# Patient Record
Sex: FEMALE | Race: WHITE
[De-identification: names, ages, dates, MRNs, and addresses within clinical notes are randomized per-mention and may not be internally consistent; named-entity substitution may affect disease eponyms.]

---

## 2019-04-11 ENCOUNTER — HOSPITAL ENCOUNTER (EMERGENCY)
Dept: HOSPITAL 74 - FER | Age: 64
Discharge: HOME | End: 2019-04-11
Payer: COMMERCIAL

## 2019-04-11 VITALS — DIASTOLIC BLOOD PRESSURE: 72 MMHG | SYSTOLIC BLOOD PRESSURE: 125 MMHG | TEMPERATURE: 97.7 F | HEART RATE: 68 BPM

## 2019-04-11 VITALS — BODY MASS INDEX: 21.7 KG/M2
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
## 2019-04-11 DIAGNOSIS — M81.0: ICD-10-CM

## 2019-04-11 DIAGNOSIS — F60.0: ICD-10-CM

## 2019-04-11 DIAGNOSIS — S93.402A: Primary | ICD-10-CM
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
## 2019-04-11 PROCEDURE — 2W3RX1Z IMMOBILIZATION OF LEFT LOWER LEG USING SPLINT: ICD-10-PCS

## 2019-04-11 NOTE — PDOC
History of Present Illness





- General


History Source: Patient


Exam Limitations: No Limitations





- History of Present Illness


Initial Comments: 


04/11/19 18:09


The patient is a 63-year-old female with a past medical history significant for 

osteoporosis (current not on medication) presents to the emergency department 

with an ankle injury. The patient reports around 2:30 pm today, she was walking 

to the phone when she twisted her left ankle outwards, denies falling. The 

patient states she was able to ambulate on the ankle with discomfort; she went 

to the stairs and sat down. The patient reports she put ice on the ankle and 

wrapped it up tightly, without relief. Denies taking any medication. The 

patient indicates the pain had been progressively worsening. The patient 

reports she started a new job yesterday, where the facility was freezing, which 

made her knee down numb. Denies numbness, tingling or loss of sensation. 


Allergies: Abx for UTI, (unknown) 


PCP: Dr. Schoen





<Roslyn Younger - Last Filed: 04/11/19 18:12>





<Eb White - Last Filed: 04/11/19 18:20>





- General


Chief Complaint: Injury


Stated Complaint: LEFT ANKLE, LEFT FOOT PAIN


Time Seen by Provider: 04/11/19 17:42





Past History





<Roslyn Younger - Last Filed: 04/11/19 18:12>





- Past Medical History


COPD: No


Psychiatric Problems: Yes (paranoia)





- Surgical History


Abdominal Surgery: Yes (for intestinal blockage)





- Suicide/Smoking/Psychosocial Hx


Smoking History: Never smoked


Have you smoked in the past 12 months: No


Information on smoking cessation initiated: No


Hx Alcohol Use: No


Drug/Substance Use Hx: No





<Eb White - Last Filed: 04/11/19 18:20>





- Past Medical History


Allergies/Adverse Reactions: 


 Allergies











Allergy/AdvReac Type Severity Reaction Status Date / Time


 


unknown antibiotic for uti Allergy   Uncoded 04/11/19 17:44











Home Medications: 


Ambulatory Orders





Risperidone [Risperdal] 1 mg PO HS 08/24/15 











**Review of Systems





- Review of Systems


Musculoskeletal: Yes: Joint Pain


Neurological: No: Paresthesia, Tingling, Weakness





<Eb White - Last Filed: 04/11/19 18:20>





*Physical Exam





- Vital Signs


 Last Vital Signs











Temp Pulse Resp BP Pulse Ox


 


 97.7 F   68   18   125/72   100 


 


 04/11/19 17:40  04/11/19 17:40  04/11/19 17:40  04/11/19 17:40  04/11/19 17:40














- Physical Exam


Comments: 


04/11/19 18:11


GENERAL: The patient is awake, alert, and fully oriented, in no acute distress.


HEAD:Normal with no signs of trauma.


EYES: Pupils equal, round and reactive to light, extraocular movements intact, 

sclera anicteric, conjunctiva clear.


EXTREMITIES: +Discomfort to the anterior medial ankle joint, no focal 

tenderness to the knees, malleolus, or the bones of the foot. Neurovascularly 

intact. Full range of motion of the knees, ankles and toes. No deformities. No 

bruising.


NEUROLOGICAL: Normal speech, normal gait. 


PSYCH: Normal mood, normal affect.


SKIN: Warm, Dry, normal turgor, no rashes or lesions noted.





<Roslyn Younger - Last Filed: 04/11/19 18:12>





- Vital Signs


 Last Vital Signs











Temp Pulse Resp BP Pulse Ox


 


 97.7 F   68   18   125/72   100 


 


 04/11/19 17:40  04/11/19 17:40  04/11/19 17:40  04/11/19 17:40  04/11/19 17:40














<Eb White - Last Filed: 04/11/19 18:20>





ED Treatment Course





- RADIOLOGY


Radiology Studies Ordered: 














 Category Date Time Status


 


 ANKLE & FOOT-LEFT* [RAD] Stat Radiology  04/11/19 17:52 Ordered














<Eb White - Last Filed: 04/11/19 18:20>





Medical Decision Making





- Medical Decision Making





04/11/19 17:54


63-year-old female presents with left ankle eversion injury about 3 hours ago, 

delayed onset of discomfort to the anterior ankle joint space without motor or 

sensory deficit. No other injuries. Did not take anything for pain, applied 

ice. Presents for evaluation.





Vital signs normal.


Left lower extremity without deformity, slight discomfort to the anterior 

medial left ankle joint space without bony tenderness to the tibia/fibula/

malleoli/. Neurovascular intact.





63-year-old female with left ankle sprain, rule out fracture.


Motrin for pain


Already applied ice


Left ankle/foot x-ray


Reassurance and disposition accordingly





04/11/19 18:18


On my preliminary review, there is no acute fracture or dislocation of the left 

ankle or foot. Positive osteoporosis.


Air cast applied, understands return criteria.





<Eb White - Last Filed: 04/11/19 18:20>





*DC/Admit/Observation/Transfer





- Attestations


Scribe Attestion: 


04/11/19 18:09


Documentation prepared by Roslyn Younger, acting as medical scribe for Eb White MD. 





<Roslyn Younger - Last Filed: 04/11/19 18:12>





<Eb White - Last Filed: 04/11/19 18:20>


Diagnosis at time of Disposition: 


Left ankle sprain


Qualifiers:


 Encounter type: initial encounter Involved ligament of ankle: unspecified 

ligament Qualified Code(s): S93.402A - Sprain of unspecified ligament of left 

ankle, initial encounter








- Discharge Dispostion


Disposition: HOME


Condition at time of disposition: Stable





- Referrals


Referrals: 


Schoen,Herbert, MD [Primary Care Provider] - 


Aubrey Mckenna MD [Staff Physician] - 





- Patient Instructions


Printed Discharge Instructions:  DI for Ankle Sprain


Additional Instructions: 


Activity as tolerated. Stay hydrated. AirCast as provided as needed for 

support. 





Tylenol 1000 mg every 8 hours and/or ibuprofen 600 mg every 8 hours as needed 

for pain. Ice and elevate the affected areas for 20 minutes every 3-4 hours to 

reduce swelling. 





Continue your medications as previously prescribed by your physician.





You should follow up with an orthopedic as needed regarding today's emergency 

department visit.





Return to the emergency department for any new or concerning symptoms, 

particularly intolerable pain, severe swelling or discoloration, numbness.





- Post Discharge Activity
 used

## 2021-04-20 ENCOUNTER — APPOINTMENT (OUTPATIENT)
Dept: OBGYN | Facility: CLINIC | Age: 66
End: 2021-04-20

## 2022-07-26 ENCOUNTER — EMERGENCY (EMERGENCY)
Facility: HOSPITAL | Age: 67
LOS: 1 days | Discharge: HOME | End: 2022-07-26
Admitting: EMERGENCY MEDICINE
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
## 2022-07-26 DIAGNOSIS — S29.8XXA OTHER SPECIFIED INJURIES OF THORAX, INITIAL ENCOUNTER: ICD-10-CM
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
## 2022-07-26 DIAGNOSIS — M25.511 PAIN IN RIGHT SHOULDER: ICD-10-CM

## 2022-07-26 DIAGNOSIS — S49.81XA OTHER SPECIFIED INJURIES OF RIGHT SHOULDER AND UPPER ARM, INITIAL ENCOUNTER: ICD-10-CM

## 2022-07-26 PROCEDURE — 99284 EMERGENCY DEPT VISIT MOD MDM: CPT

## 2022-07-26 PROCEDURE — 71046 X-RAY EXAM CHEST 2 VIEWS: CPT | Mod: 26

## 2022-07-26 PROCEDURE — 73030 X-RAY EXAM OF SHOULDER: CPT | Mod: 26,RT

## 2022-07-28 ENCOUNTER — EMERGENCY (EMERGENCY)
Facility: HOSPITAL | Age: 67
LOS: 1 days | Discharge: HOME | End: 2022-07-28
Admitting: EMERGENCY MEDICINE

## 2022-07-28 PROCEDURE — L9992: CPT

## 2022-08-16 PROBLEM — Z00.00 ENCOUNTER FOR PREVENTIVE HEALTH EXAMINATION: Status: ACTIVE | Noted: 2022-08-16

## 2022-08-23 ENCOUNTER — APPOINTMENT (OUTPATIENT)
Dept: HEMATOLOGY ONCOLOGY | Facility: CLINIC | Age: 67
End: 2022-08-23

## 2024-10-07 ENCOUNTER — EMERGENCY (EMERGENCY)
Facility: HOSPITAL | Age: 69
LOS: 1 days | Discharge: HOME | End: 2024-10-07
Attending: EMERGENCY MEDICINE
Payer: SELF-PAY

## 2024-10-07 ENCOUNTER — EMERGENCY (EMERGENCY)
Facility: HOSPITAL | Age: 69
LOS: 1 days | Discharge: HOME | End: 2024-10-07
Attending: STUDENT IN AN ORGANIZED HEALTH CARE EDUCATION/TRAINING PROGRAM
Payer: MEDICARE

## 2024-10-07 VITALS
RESPIRATION RATE: 18 BRPM | HEIGHT: 62 IN | WEIGHT: 104.94 LBS | TEMPERATURE: 98 F | OXYGEN SATURATION: 97 % | HEART RATE: 75 BPM | DIASTOLIC BLOOD PRESSURE: 87 MMHG | SYSTOLIC BLOOD PRESSURE: 190 MMHG

## 2024-10-07 VITALS
WEIGHT: 110.01 LBS | HEIGHT: 62 IN | RESPIRATION RATE: 16 BRPM | TEMPERATURE: 98 F | SYSTOLIC BLOOD PRESSURE: 174 MMHG | HEART RATE: 65 BPM | DIASTOLIC BLOOD PRESSURE: 77 MMHG | OXYGEN SATURATION: 97 %

## 2024-10-07 LAB
BASOPHILS # BLD AUTO: 0.03 K/UL — SIGNIFICANT CHANGE UP (ref 0–0.2)
BASOPHILS NFR BLD AUTO: 0.4 % — SIGNIFICANT CHANGE UP (ref 0–2)
EOSINOPHIL # BLD AUTO: 0.07 K/UL — SIGNIFICANT CHANGE UP (ref 0–0.5)
EOSINOPHIL NFR BLD AUTO: 1 % — SIGNIFICANT CHANGE UP (ref 0–6)
FLUAV AG NPH QL: SIGNIFICANT CHANGE UP
FLUBV AG NPH QL: SIGNIFICANT CHANGE UP
HCT VFR BLD CALC: 44.2 % — SIGNIFICANT CHANGE UP (ref 34.5–45)
HGB BLD-MCNC: 14.6 G/DL — SIGNIFICANT CHANGE UP (ref 11.5–15.5)
IMM GRANULOCYTES NFR BLD AUTO: 0.1 % — SIGNIFICANT CHANGE UP (ref 0–0.9)
LYMPHOCYTES # BLD AUTO: 1.88 K/UL — SIGNIFICANT CHANGE UP (ref 1–3.3)
LYMPHOCYTES # BLD AUTO: 26.3 % — SIGNIFICANT CHANGE UP (ref 13–44)
MCHC RBC-ENTMCNC: 29.1 PG — SIGNIFICANT CHANGE UP (ref 27–34)
MCHC RBC-ENTMCNC: 33 GM/DL — SIGNIFICANT CHANGE UP (ref 32–36)
MCV RBC AUTO: 88 FL — SIGNIFICANT CHANGE UP (ref 80–100)
MONOCYTES # BLD AUTO: 0.46 K/UL — SIGNIFICANT CHANGE UP (ref 0–0.9)
MONOCYTES NFR BLD AUTO: 6.4 % — SIGNIFICANT CHANGE UP (ref 2–14)
NEUTROPHILS # BLD AUTO: 4.69 K/UL — SIGNIFICANT CHANGE UP (ref 1.8–7.4)
NEUTROPHILS NFR BLD AUTO: 65.8 % — SIGNIFICANT CHANGE UP (ref 43–77)
PLATELET # BLD AUTO: 269 K/UL — SIGNIFICANT CHANGE UP (ref 150–400)
RBC # BLD: 5.02 M/UL — SIGNIFICANT CHANGE UP (ref 3.8–5.2)
RBC # FLD: 13.6 % — SIGNIFICANT CHANGE UP (ref 10.3–14.5)
RSV RNA NPH QL NAA+NON-PROBE: SIGNIFICANT CHANGE UP
SARS-COV-2 RNA SPEC QL NAA+PROBE: SIGNIFICANT CHANGE UP
WBC # BLD: 7.14 K/UL — SIGNIFICANT CHANGE UP (ref 3.8–10.5)
WBC # FLD AUTO: 7.14 K/UL — SIGNIFICANT CHANGE UP (ref 3.8–10.5)

## 2024-10-07 PROCEDURE — 82962 GLUCOSE BLOOD TEST: CPT

## 2024-10-07 PROCEDURE — 99285 EMERGENCY DEPT VISIT HI MDM: CPT

## 2024-10-07 PROCEDURE — 99283 EMERGENCY DEPT VISIT LOW MDM: CPT

## 2024-10-07 PROCEDURE — 99282 EMERGENCY DEPT VISIT SF MDM: CPT

## 2024-10-07 PROCEDURE — 93010 ELECTROCARDIOGRAM REPORT: CPT

## 2024-10-07 RX ORDER — MIDAZOLAM HCL 1 MG/ML
2 VIAL (ML) INJECTION ONCE
Refills: 0 | Status: DISCONTINUED | OUTPATIENT
Start: 2024-10-07 | End: 2024-10-07

## 2024-10-07 RX ORDER — OLANZAPINE 2.5 MG
5 TABLET ORAL ONCE
Refills: 0 | Status: COMPLETED | OUTPATIENT
Start: 2024-10-07 | End: 2024-10-07

## 2024-10-07 NOTE — ED ADULT NURSE NOTE - OBJECTIVE STATEMENT
Patient presents with social work complaints, per report.  Pt refuses to answer questions, throughout shift, noted with more erratic behavior, stating "they're trying to kill me."  Pt refused CT scan, stating "they're trying to kill me with electricity and make my veins thrombose and explode." "My family is dead, they're all dead, they put me in second grade like all the others.  I'm the smartest one here."  Pt medicated and taken to CC area for further monitoring and placed in yellow gown pending psych.

## 2024-10-07 NOTE — ED ADULT TRIAGE NOTE - CHIEF COMPLAINT QUOTE
pt recently in Emergency Department refusing treatment found sitting in hallway states she wants to see a doctor denies pain states she has smelling issues refuses to state anything else

## 2024-10-07 NOTE — ED PROVIDER NOTE - AVIAN FLU SYMPTOMS
POC reviewed with pt.  Pt states complete understanding. All questions answered.   
Plan of care reviewed with patient, discharge criteria met and instructions given. Verbalized understanding.   
No

## 2024-10-07 NOTE — ED ADULT NURSE NOTE - NSFALLUNIVINTERV_ED_ALL_ED
Bed/Stretcher in lowest position, wheels locked, appropriate side rails in place/Call bell, personal items and telephone in reach/Instruct patient to call for assistance before getting out of bed/chair/stretcher/Non-slip footwear applied when patient is off stretcher/[place] to call system/Physically safe environment - no spills, clutter or unnecessary equipment/Purposeful proactive rounding/Room/bathroom lighting operational, light cord in reach

## 2024-10-07 NOTE — ED ADULT TRIAGE NOTE - CHIEF COMPLAINT QUOTE
as per EMS pt was found sitting in back seat of her car in the middle of the street pt was uncooperative with EMS, pt A&Ox4 easily irritable, stating "I was just tired and resting in my car", resp even and unlabored no distress noted, pt changed into yellow gown and belongings secured

## 2024-10-07 NOTE — ED PROVIDER NOTE - OBJECTIVE STATEMENT
Patient is a 69-year-old female who denies any past medical history who presents to the ED brought in by EMS for reported bizarre behavior.  Patient was found in back of car.  Patient says she was just resting.  Patient says that she can make her own decisions and that she would like to leave.  Patient is alert and oriented, irritable, but does answer directions.  Denies any SI, HI, or AVH.  Patient is clean, well-dressed.  Patient has no medical complaints.  When asked for corroboration, provides phone number of her friend Bob at [phone], is currently unable to be reached.  Message was left.
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
## 2024-10-07 NOTE — ED PROVIDER NOTE - PHYSICAL EXAMINATION
Gen: well nourished female in no acute distress. She is holding her right forehead however deny any pain to head or eye when asked   Head: normocephalic, atraumatic  EENT: EOMI, moist mucous membranes. pupils 2-3mm, reactive bilaterally  Lung: no increased work of breathing, clear to auscultation bilaterally, no wheezing, rales, rhonchi, speaking in full sentences  CV: regular rate, regular rhythm  Abd: soft, non-tender, non-distended,  no CVA tenderness  MSK: No edema, no visible deformities, full range of motion in all 4 extremities  Neuro: Awake, alert, oriented x 3. no focal neurologic deficits  Skin: No obvious rash, no jaundice  Psych: bizarre affect, normal speech

## 2024-10-07 NOTE — ED PROVIDER NOTE - CLINICAL SUMMARY MEDICAL DECISION MAKING FREE TEXT BOX
Ddx: No evidence of manic or suicidal behavior/ pt able to answer questions, and exhibits no signs of acting upon internal stimuli. No signs of intoxication.  Plan: Ideally would be to obtain corroboration from family or friends, but pt contact unable to be reached, and pt has no other contacts. Pt declines medical or psychiatric care, and does not appear to be danger to self or others. Patient has capacity to deny further care. At this time, risks of sedating patient to obtain labs and further evaluation outweigh benefits.

## 2024-10-07 NOTE — ED PROVIDER NOTE - PROGRESS NOTE DETAILS
Melisa Chamberlain, DO: patient becoming increasingly confused and agitated, refusing CT, screaming "they're after me!". Zyprexa 5mg IM ordered. Melisa Chamberlain, DO: patient did require additional ativan 2mg IV to complete CT head and remainder of labs. Work up negative, patient medically cleared. Case endorsed to tele psych. pending eval. Melisa Chamberlain, DO: patient becoming increasingly confused and agitated, refusing CT, screaming "they're after me!". Zyprexa 5mg and Versed 2mg IM ordered. Dr. Houston: Pt signed out to me by Dr. Simpson. No acute overnight events. Patient pending reassessment by psych. ITNO Desouza: patient received in s/o; has bed at [facility], transfer to Dr. Swan
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
## 2024-10-07 NOTE — ED PROVIDER NOTE - PATIENT PORTAL LINK FT
You can access the FollowMyHealth Patient Portal offered by [facility] by registering at the following website: [email or web address]. By joining [facility]’s FollowMyHealth portal, you will also be able to view your health information using other applications (apps) compatible with our system.

## 2024-10-08 DIAGNOSIS — F29 UNSPECIFIED PSYCHOSIS NOT DUE TO A SUBSTANCE OR KNOWN PHYSIOLOGICAL CONDITION: ICD-10-CM

## 2024-10-08 LAB
ALBUMIN SERPL ELPH-MCNC: 4.3 G/DL — SIGNIFICANT CHANGE UP (ref 3.3–5.2)
ALP SERPL-CCNC: 92 U/L — SIGNIFICANT CHANGE UP (ref 40–120)
ALT FLD-CCNC: 14 U/L — SIGNIFICANT CHANGE UP
AMPHET UR-MCNC: NEGATIVE — SIGNIFICANT CHANGE UP
ANION GAP SERPL CALC-SCNC: 9 MMOL/L — SIGNIFICANT CHANGE UP (ref 5–17)
APAP SERPL-MCNC: <3 UG/ML — LOW (ref 10–26)
APPEARANCE UR: CLEAR — SIGNIFICANT CHANGE UP
AST SERPL-CCNC: 23 U/L — SIGNIFICANT CHANGE UP
BACTERIA # UR AUTO: NEGATIVE /HPF — SIGNIFICANT CHANGE UP
BARBITURATES UR SCN-MCNC: NEGATIVE — SIGNIFICANT CHANGE UP
BENZODIAZ UR-MCNC: POSITIVE
BILIRUB SERPL-MCNC: 1.6 MG/DL — SIGNIFICANT CHANGE UP (ref 0.4–2)
BILIRUB UR-MCNC: NEGATIVE — SIGNIFICANT CHANGE UP
BUN SERPL-MCNC: 14.7 MG/DL — SIGNIFICANT CHANGE UP (ref 8–20)
CALCIUM SERPL-MCNC: 9.6 MG/DL — SIGNIFICANT CHANGE UP (ref 8.4–10.5)
CAST: 1 /LPF — SIGNIFICANT CHANGE UP (ref 0–4)
CHLORIDE SERPL-SCNC: 99 MMOL/L — SIGNIFICANT CHANGE UP (ref 96–108)
CO2 SERPL-SCNC: 27 MMOL/L — SIGNIFICANT CHANGE UP (ref 22–29)
COCAINE METAB.OTHER UR-MCNC: NEGATIVE — SIGNIFICANT CHANGE UP
COLOR SPEC: YELLOW — SIGNIFICANT CHANGE UP
CREAT SERPL-MCNC: 0.62 MG/DL — SIGNIFICANT CHANGE UP (ref 0.5–1.3)
DIFF PNL FLD: NEGATIVE — SIGNIFICANT CHANGE UP
EGFR: 96 ML/MIN/1.73M2 — SIGNIFICANT CHANGE UP
ETHANOL SERPL-MCNC: <10 MG/DL — SIGNIFICANT CHANGE UP (ref 0–9)
FENTANYL UR QL SCN: NEGATIVE — SIGNIFICANT CHANGE UP
GLUCOSE SERPL-MCNC: 106 MG/DL — HIGH (ref 70–99)
GLUCOSE UR QL: NEGATIVE MG/DL — SIGNIFICANT CHANGE UP
HIV 1 & 2 AB SERPL IA.RAPID: SIGNIFICANT CHANGE UP
KETONES UR-MCNC: ABNORMAL MG/DL
LEUKOCYTE ESTERASE UR-ACNC: ABNORMAL
METHADONE UR-MCNC: NEGATIVE — SIGNIFICANT CHANGE UP
NITRITE UR-MCNC: NEGATIVE — SIGNIFICANT CHANGE UP
OPIATES UR-MCNC: NEGATIVE — SIGNIFICANT CHANGE UP
PCP SPEC-MCNC: SIGNIFICANT CHANGE UP
PCP UR-MCNC: NEGATIVE — SIGNIFICANT CHANGE UP
PH UR: 5.5 — SIGNIFICANT CHANGE UP (ref 5–8)
POTASSIUM SERPL-MCNC: 4.3 MMOL/L — SIGNIFICANT CHANGE UP (ref 3.5–5.3)
POTASSIUM SERPL-SCNC: 4.3 MMOL/L — SIGNIFICANT CHANGE UP (ref 3.5–5.3)
PROT SERPL-MCNC: 6.9 G/DL — SIGNIFICANT CHANGE UP (ref 6.6–8.7)
PROT UR-MCNC: SIGNIFICANT CHANGE UP MG/DL
RBC CASTS # UR COMP ASSIST: 1 /HPF — SIGNIFICANT CHANGE UP (ref 0–4)
SALICYLATES SERPL-MCNC: <0.6 MG/DL — LOW (ref 10–20)
SODIUM SERPL-SCNC: 135 MMOL/L — SIGNIFICANT CHANGE UP (ref 135–145)
SP GR SPEC: 1.03 — SIGNIFICANT CHANGE UP (ref 1–1.03)
SQUAMOUS # UR AUTO: 2 /HPF — SIGNIFICANT CHANGE UP (ref 0–5)
THC UR QL: NEGATIVE — SIGNIFICANT CHANGE UP
TSH SERPL-MCNC: 0.74 UIU/ML — SIGNIFICANT CHANGE UP (ref 0.27–4.2)
UROBILINOGEN FLD QL: 1 MG/DL — SIGNIFICANT CHANGE UP (ref 0.2–1)
WBC UR QL: 5 /HPF — SIGNIFICANT CHANGE UP (ref 0–5)

## 2024-10-08 PROCEDURE — 90792 PSYCH DIAG EVAL W/MED SRVCS: CPT

## 2024-10-08 PROCEDURE — 70450 CT HEAD/BRAIN W/O DYE: CPT | Mod: 26,MC

## 2024-10-08 RX ORDER — HALOPERIDOL LACTATE 2 MG/ML
5 CONCENTRATE, ORAL ORAL EVERY 6 HOURS
Refills: 0 | Status: DISCONTINUED | OUTPATIENT
Start: 2024-10-08 | End: 2024-10-15

## 2024-10-08 RX ORDER — OLANZAPINE 2.5 MG
5 TABLET ORAL ONCE
Refills: 0 | Status: COMPLETED | OUTPATIENT
Start: 2024-10-08 | End: 2024-10-08

## 2024-10-08 RX ADMIN — Medication 5 MILLIGRAM(S): at 00:05

## 2024-10-08 RX ADMIN — Medication 2 MILLIGRAM(S): at 01:21

## 2024-10-08 RX ADMIN — Medication 2 MILLIGRAM(S): at 00:05

## 2024-10-08 NOTE — ED BEHAVIORAL HEALTH ASSESSMENT NOTE - REASON
require further evaluation and collateral given patient's inability to participate meaningfully in the interview today

## 2024-10-08 NOTE — ED BEHAVIORAL HEALTH ASSESSMENT NOTE - SUMMARY
This is a 70 yo female with no significant PMHx, PPHx of unspecified psychotic disorder, at least 1 past IPU admission at [place] about a month ago for ?paranoia, no known SAs, not currently established with an outpatient provider, domiciled alone at a condo in [place], [place] which she pays for from snf pension (worked formerly at [employer] before snf). unmarried, no known dependents who was BIB EMS due to bizarre/erratic behavior in the parking lot of her condo. Psychiatry was consulted for evaluation of this behavior.    Due to receiving multiple rounds of PRN IM Medication, patient was unable to be evaluated until the end of the day at which point she remained extremely groggy but was able to answer questions evaluating orientation and she was A&Ox4. Collateral from brother suggests paranoia and delusions are present which are relatively new as she was not demonstrating these symptoms when he saw her a week ago but was symptomatic when he saw her the other day. Does not appear to be acute infectious process at this time and CT head is negative for acute intracranial pathology. Pending UTox (although brother states patient does not use substances). Medication noncompliance may be playing a role in the patient's presentation but will hold the patient overnight to obtain more collateral from outpatient providers and also to discuss the patient's history with her sister who was unable to be reached today. Avoid using PRN medication as much as possible given the patient's sensitivity to the medications she received today, supportive care otherwise.

## 2024-10-08 NOTE — ED BEHAVIORAL HEALTH ASSESSMENT NOTE - DETAILS
no hx of SI/NSSIB/SAs deferred patient updated updated reportedly was hospitalized at [facility] about a month ago for paranoia no

## 2024-10-08 NOTE — ED BEHAVIORAL HEALTH ASSESSMENT NOTE - DESCRIPTION
Vital Signs Last 24 Hrs  T(C): 36.7 (08 Oct 2024 15:15), Max: 36.7 (08 Oct 2024 12:03)  T(F): 98.1 (08 Oct 2024 15:15), Max: 98.1 (08 Oct 2024 15:15)  HR: 79 (08 Oct 2024 19:05) (43 - 87)  BP: 128/87 (08 Oct 2024 19:05) (108/63 - 148/60)  BP(mean): 77 (08 Oct 2024 04:12) (72 - 77)  RR: 18 (08 Oct 2024 19:05) (16 - 18)  SpO2: 98% (08 Oct 2024 19:05) (96% - 100%)    Parameters below as of 08 Oct 2024 19:05  Patient On (Oxygen Delivery Method): room air unknown as described in HPI

## 2024-10-08 NOTE — ED BEHAVIORAL HEALTH ASSESSMENT NOTE - HPI (INCLUDE ILLNESS QUALITY, SEVERITY, DURATION, TIMING, CONTEXT, MODIFYING FACTORS, ASSOCIATED SIGNS AND SYMPTOMS)
This is a 68 yo female with no significant PMHx, PPHx of unspecified psychotic disorder, at least 1 past IPU admission at [place] about a month ago for ?paranoia, no known SAs, not currently established with an outpatient provider, domiciled alone at a condo in [place], [place] which she pays for from senior care pension (worked formerly at [employer] before senior care). unmarried, no known dependents who was BIB EMS due to bizarre/erratic behavior in the parking lot of her condo. Psychiatry was consulted for evaluation of this behavior.    Patient was evaluated after receiving multiple rounds of IM PRN medication including Zyprexa 5 mg IM x 1, Versed 2 mg IM x 1, Ativan 2 mg IM x 1. She was sedated and barely rousable for the encounter until almost 24 hours after receiving these medications to which she was still groggy but was A&Ox4. She notes "I don't take medications other than the needles that they gave me" and terminated the interview, was agreeable to staying overnight for reassessment given how sedated she was. I spoke with her brother on the phone who reports that the patient has a history of paranoia bordering on mental illness but is overall fairly capable of taking care of herself as she lives alone, pays for the apartment on her own and manages herself well. He reports that he typically sees the patient once a week, last seen this past Sunday - reports that she was talking to people that weren't there which he describes "is not new for her," but notes that she wasn't exhibiting this behavior last week when he saw her. He notes that patient has a history of medication noncompliance and suspects this may have something to do with her presentation.

## 2024-10-08 NOTE — ED BEHAVIORAL HEALTH ASSESSMENT NOTE - PSYCHIATRIC ISSUES AND PLAN (INCLUDE STANDING AND PRN MEDICATION)
unspecified psychotic disorder - minimize use of PRNs unless absolutely necessary given patient's sensitivity to these medications
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
## 2024-10-08 NOTE — ED BEHAVIORAL HEALTH ASSESSMENT NOTE - CURRENT MEDICATION
[facility]
PSYCKES suggests patient should be taking Risperdal 1 mg but this is outdated (based on 2020 report)

## 2024-10-09 VITALS
OXYGEN SATURATION: 97 % | SYSTOLIC BLOOD PRESSURE: 128 MMHG | RESPIRATION RATE: 18 BRPM | HEART RATE: 88 BPM | TEMPERATURE: 98 F | DIASTOLIC BLOOD PRESSURE: 79 MMHG

## 2024-10-09 PROCEDURE — 87637 SARSCOV2&INF A&B&RSV AMP PRB: CPT

## 2024-10-09 PROCEDURE — 80307 DRUG TEST PRSMV CHEM ANLYZR: CPT

## 2024-10-09 PROCEDURE — 87086 URINE CULTURE/COLONY COUNT: CPT

## 2024-10-09 PROCEDURE — 85025 COMPLETE CBC W/AUTO DIFF WBC: CPT

## 2024-10-09 PROCEDURE — 93005 ELECTROCARDIOGRAM TRACING: CPT

## 2024-10-09 PROCEDURE — 96374 THER/PROPH/DIAG INJ IV PUSH: CPT

## 2024-10-09 PROCEDURE — 80053 COMPREHEN METABOLIC PANEL: CPT

## 2024-10-09 PROCEDURE — 84443 ASSAY THYROID STIM HORMONE: CPT

## 2024-10-09 PROCEDURE — 99285 EMERGENCY DEPT VISIT HI MDM: CPT | Mod: 25

## 2024-10-09 PROCEDURE — 81001 URINALYSIS AUTO W/SCOPE: CPT

## 2024-10-09 PROCEDURE — 70450 CT HEAD/BRAIN W/O DYE: CPT | Mod: MC

## 2024-10-09 PROCEDURE — 99213 OFFICE O/P EST LOW 20 MIN: CPT

## 2024-10-09 PROCEDURE — 96372 THER/PROPH/DIAG INJ SC/IM: CPT | Mod: XU

## 2024-10-09 PROCEDURE — 36415 COLL VENOUS BLD VENIPUNCTURE: CPT

## 2024-10-09 PROCEDURE — 86703 HIV-1/HIV-2 1 RESULT ANTBDY: CPT

## 2024-10-09 RX ORDER — GABAPENTIN 800 MG/1
100 TABLET, FILM COATED ORAL THREE TIMES A DAY
Refills: 0 | Status: DISCONTINUED | OUTPATIENT
Start: 2024-10-09 | End: 2024-10-15

## 2024-10-09 RX ORDER — MIRTAZAPINE 30 MG/1
7.5 TABLET, FILM COATED ORAL AT BEDTIME
Refills: 0 | Status: DISCONTINUED | OUTPATIENT
Start: 2024-10-09 | End: 2024-10-15

## 2024-10-09 NOTE — ED BEHAVIORAL HEALTH PROGRESS NOTE - DETAILS:
no acute events overnight, no PRNs required. I met with the patient at the bedside. she endorses frustration with her neighbors, believes that they are "messing with me," and that they are "listening to all of my conversations." she notes unhappiness with her current residence. she repeats "I don't know" multiple times throughout the encounter, seemingly unsure of herself but requests that she be discharged. she expresses frustration and paranoia with the hospital and complains that she didn't get enough to eat, notes that she has been struggling with her appetite as of recently and notes "I need to eat, I didn't get enough to eat this morning." She denies SI/HI, AVH and other perceptual disturbances, denies changes in mood or anxiety levels currently. patient is unable to adequately describe circumstances leading to her presentation to the ER, repeating "I don't know."

## 2024-10-09 NOTE — ED ADULT NURSE REASSESSMENT NOTE - GENERAL PATIENT STATE
comfortable appearance/cooperative
comfortable appearance/resting/sleeping
comfortable appearance/resting/sleeping
comfortable appearance/cooperative

## 2024-10-09 NOTE — ED BEHAVIORAL HEALTH PROGRESS NOTE - COLLATERAL INFORMATION (NAME, PHONE, RELATIONSHIP):
obtained collateral from patient's sister Marina [phone]. her sister reports that patient has a long history of schizophrenia and has been in and out of hospitals most of her life. reports that she has a distant history of a suicide attempt where she slit her wrists "many years ago," unclear if this occurred in the context of decompensated schizophrenia vs a breakup with an ex-boyfriend vs both. she reports that the patient is incredibly paranoid of her neighbors at her current condo she lives in and doesn't believe that her neighbors are harassing the patient in the way the patient complains. she notes that patient typically improves when she is back on her medications, recognizing that she hasn't taken medications since her discharge from [facility] earlier in the summer. prior to earlier this summer, patient was hospitalized last summer at [facility] as well and sister notes that a TOO order was obtained forcing the patient to take Risperdal. sister reports that she spoke with the patient last week and seemed "bright, cheerful" over the phone at the time.

## 2024-10-09 NOTE — ED BEHAVIORAL HEALTH NOTE - BEHAVIORAL HEALTH NOTE
SW NOTE: Rec per psych is involuntary psychiatric services on 9.27 legal basis- 2PC's completed and sent to tele- per telehub (Antony DACOSTA reviewing pt- SW continues to follow- to assist w/ final transfer once formally accepted

## 2024-10-09 NOTE — ED ADULT NURSE REASSESSMENT NOTE - NS ED NURSE REASSESS COMMENT FT1
Assumed patient care around 7:00 PM. Patient in the community area with no complains. Patient provided urine sample for admission protocol, patient is apparently calm, no agitation noted, alert and compliant. No attempt to self harm or to harm others, safety maintained.
Assumed patient care at 7:00 PM. Patient in [facility] area, arguing with staff over her transfer to [facility], patient requested phone to talk to brother. Ambulance called, report given awaiting for ambulance to transfer patient.
Patient denies suicidal or homicidal ideations.  Patient mood is subdued.  Patient resting in bed with no distress.  Safety of patient maintained.
Patient noted in bed sleeping, safety maintained.
Patient presented in  area dressed in yellow gowns.  Patient is awake but drowsy.  Patient oriented to staff and educated about plan of care.  Patient denies need for psychiatric evaluation but admits treatment in the past.  Patient unable to give details about past treatment or behavior in ED prior to coming to  area.  No attempts to harm self or others.  Ambulated with a slow steady gait to [facility].
Patient remain in good behavioral control. She was able to sleep the night. She complied with nursing care, voiced no complains, no agitation noted, safety maintained.
Patient was safely discharge to [facility] via ambulance. All belongings was returned to patient.
pt was accepted to [facility] and report given to Nelly MCCALL. pt educated about plan to transfer for inpatient tx and states "I am not going!"  provider made aware.
Patient has been resting in bed appears to be sleeping with no distress and regular nonlabored breathing.  Safety of patient maintained.
Assumed care of patient at 07:20.  Patient resting in bed appears to be sleeping with no distress and regular nonlabored breathing noted.  Safety of patient maintained.
Patient provided dinner at bedside.  Patient awakens easily to verbal stimuli but will fall asleep again.  Patient was educated about need for urine sample and collection cup provided.  No attempts to harm self or others and safety maintained.

## 2024-10-09 NOTE — ED BEHAVIORAL HEALTH PROGRESS NOTE - PSYCHIATRIC ISSUES AND PLAN (INCLUDE STANDING AND PRN MEDICATION)
schizophrenia, r/o unspecified mood disorder - restart Risperdal 1 mg qhs with goals of transitioning to ALEMAN given patient's history of noncompliance. offer Remeron 7.5 mg qhsprn for treatment of insomnia as well as opening possibility of treatment of underlying depression/decreased appetite. offer gabapentin 100 mg tidprn for anxiety

## 2024-10-09 NOTE — ED ADULT NURSE REASSESSMENT NOTE - COMFORT CARE
darkened lights
meal provided/plan of care explained
darkened lights
meal provided/plan of care explained

## 2024-10-09 NOTE — ED BEHAVIORAL HEALTH PROGRESS NOTE - SUMMARY
Patient was held overnight due to the fact that she couldn't properly be assessed from being overly sedated, having received multiple rounds of PRN medications the night prior for episodes of agitation. Additionally, collateral from her brother was not as illuminating to her presentation as collateral obtained from her sister today. In light of collateral from sister indicating patient's long history of schizophrenia, noncompliance with treatment and past positive response to medication noncompliance, advocacy for inpatient admission as well as patient's lack of insight into her symptoms and presentation observed on encounter today, we will proceed with involuntary hospitalization given that patient is unable to care for self at best and poses a risk to herself and others at worst. She has been treated with Risperdal in the past with positive response and would benefit from not just continuing this medication but continuing the ALEMAN formulation in particular given her noncompliance with treatment leading to recurrent hospitalizations; will start Risperdal 1 mg qhs as we await transfer for her. Though she doesn't admit to symptoms of depression on exam today, she may benefit from treatment with an antidepressant, particularly Remeron which may improve appetite and sleep as well as depression; will offer Remeron 7.5 mg qhsprn if she is open to trialing this medication. Will offer Gabapentin 100 mg tidprn for episodes of anxiety as well.

## 2024-10-10 LAB
CULTURE RESULTS: SIGNIFICANT CHANGE UP
SPECIMEN SOURCE: SIGNIFICANT CHANGE UP

## 2025-03-21 NOTE — ED ADULT NURSE NOTE - ED STAT RN HANDOFF DETAILS
Pt has been evaluated by MD and medically cleared for release to . Pt in yellow gown, no IVs present, accompanied to  safely by CNA. Belongings secured in . Report given to OSCAR MCCALL.
No